# Patient Record
Sex: FEMALE | Race: WHITE | NOT HISPANIC OR LATINO | Employment: PART TIME | URBAN - METROPOLITAN AREA
[De-identification: names, ages, dates, MRNs, and addresses within clinical notes are randomized per-mention and may not be internally consistent; named-entity substitution may affect disease eponyms.]

---

## 2017-05-08 ENCOUNTER — HOSPITAL ENCOUNTER (OUTPATIENT)
Dept: LAB | Facility: CLINIC | Age: 57
End: 2017-05-08
Attending: ALLERGY & IMMUNOLOGY
Payer: COMMERCIAL

## 2017-08-09 ENCOUNTER — OFFICE VISIT (OUTPATIENT)
Dept: FAMILY MEDICINE | Facility: CLINIC | Age: 57
End: 2017-08-09

## 2017-08-09 VITALS
BODY MASS INDEX: 29.44 KG/M2 | OXYGEN SATURATION: 98 % | RESPIRATION RATE: 16 BRPM | WEIGHT: 183.2 LBS | HEART RATE: 60 BPM | SYSTOLIC BLOOD PRESSURE: 132 MMHG | DIASTOLIC BLOOD PRESSURE: 84 MMHG | HEIGHT: 66 IN | TEMPERATURE: 98.3 F

## 2017-08-09 DIAGNOSIS — E78.2 MIXED HYPERLIPIDEMIA: Primary | ICD-10-CM

## 2017-08-09 PROBLEM — Z71.89 ACP (ADVANCE CARE PLANNING): Status: ACTIVE | Noted: 2017-08-09

## 2017-08-09 PROCEDURE — 99214 OFFICE O/P EST MOD 30 MIN: CPT | Performed by: FAMILY MEDICINE

## 2017-08-09 RX ORDER — ATORVASTATIN CALCIUM 20 MG/1
TABLET, FILM COATED ORAL
Qty: 90 TABLET | Refills: 0 | Status: SHIPPED | OUTPATIENT
Start: 2017-08-09 | End: 2017-12-11

## 2017-08-09 NOTE — MR AVS SNAPSHOT
After Visit Summary   8/9/2017    Sofi Otoole    MRN: [id number]           Patient Information     Date Of Birth          1960        Visit Information        Provider Department      8/9/2017 12:00 PM Sally Mc MD [facility]        Today's Diagnoses     Mixed hyperlipidemia    -  1      Care Instructions     Mixed hyperlipidemia  (primary encounter diagnosis)  Comment: get back on your medications daily  Plan: Lipid Profile (QUEST), COMPREHENSIVE METABOLIC         PANEL (QUEST) XCMP, atorvastatin (LIPITOR) 20         MG tablet        1)  Medication: continue current medication regimen unchanged for 90 days  2)  Low fat, low cholesterol diet  3)  Regular aerobic exercise and weight loss/ recheck weight at next visit and BP  4)  Recheck in 1 month fasting to check this medication/ if doing well will refill for 6 months total, then back again, sooner should new symptoms or   problems arise.    Patient Education: Reviewed risks of elevated lipids and principles   of treatment.    Congratulations on stopping smoking.            Follow-ups after your visit        Follow-up notes from your care team     Return in about 1 month (around 9/9/2017).      Future tests that were ordered for you today     Open Standing Orders        Priority Remaining Interval Expires Ordered    Lipid Profile (QUEST) Routine 1/1 8/9/2018 8/9/2017    COMPREHENSIVE METABOLIC PANEL (QUEST) XCMP Routine 1/1 8/9/2018 8/9/2017            Who to contact     If you have questions or need follow up information about today's clinic visit or your schedule please contact [facility] directly at [phone].  Normal or non-critical lab and imaging results will be communicated to you by MyChart, letter or phone within 4 business days after the clinic has received the results. If you do not hear from us within 7 days, please contact the clinic through MyChart or phone. If you have  "a critical or abnormal lab result, we will notify you by phone as soon as possible.  Submit refill requests through [employer] or call your pharmacy and they will forward the refill request to us. Please allow 3 business days for your refill to be completed.          Additional Information About Your Visit        Shiram Credithart Information     [employer] lets you send messages to your doctor, view your test results, renew your prescriptions, schedule appointments and more. To sign up, go to [email or web address] . Click on \"Log in\" on the left side of the screen, which will take you to the Welcome page. Then click on \"Sign up Now\" on the right side of the page.     You will be asked to enter the access code listed below, as well as some personal information. Please follow the directions to create your username and password.     Your access code is: [email or web address]  Expires: 2017 12:36 PM     Your access code will  in 90 days. If you need help or a new code, please call your [place] clinic or [phone].        Care EveryWhere ID     This is your Care EveryWhere ID. This could be used by other organizations to access your [place] medical records  [id number]        Your Vitals Were     Pulse Temperature Respirations Height Pulse Oximetry Breastfeeding?    60 98.3  F (36.8  C) (Oral) 16 1.664 m (5' 5.5\") 98% No    BMI (Body Mass Index)                   30.02 kg/m2            Blood Pressure from Last 3 Encounters:   17 132/84   16 134/74   08/10/16 130/78    Weight from Last 3 Encounters:   17 83.1 kg (183 lb 3.2 oz)   16 72.6 kg (160 lb)   08/10/16 75.1 kg (165 lb 9.6 oz)                 Today's Medication Changes          These changes are accurate as of: 17 12:36 PM.  If you have any questions, ask your nurse or doctor.               These medicines have changed or have updated prescriptions.        Dose/Directions    atorvastatin 20 MG tablet   Commonly known as:  LIPITOR   This may " have changed:  See the new instructions.   Used for:  Mixed hyperlipidemia   Changed by:  Sally Mc MD        TAKE 1 TABLET (20 MG) BY MOUTH DAILY   Quantity:  90 tablet   Refills:  0            Where to get your medicines      These medications were sent to [facility] - [place], [place] - [address]  [address] [facility] [address]     Phone:  [phone]     atorvastatin 20 MG tablet                Primary Care Provider Office Phone # Fax #    Sally Mc [phone] [address] [address]        Equal Access to Services     [facility]: Hadii aad ku hadasho Soomaali, waaxda luqadaha, qaybta kaalmada adeegyada, waxbrandyn raya haysalonin adeabdulaziz west . So [facility] [phone].    ATENCIÓN: Si habla español, tiene a meza disposición servicios gratuitos de asistencia lingüística. [facility] [phone].    We comply with applicable federal civil rights laws and [place] laws. We do not discriminate on the basis of race, color, national origin, age, disability sex, sexual orientation or gender identity.            Thank you!     Thank you for choosing [facility]  for your care. Our goal is always to provide you with excellent care. Hearing back from our patients is one way we can continue to improve our services. Please take a few minutes to complete the written survey that you may receive in the mail after your visit with us. Thank you!             Your Updated Medication List - Protect others around you: Learn how to safely use, store and throw away your medicines at www.disposemymeds.org.          This list is accurate as of: 8/9/17 12:36 PM.  Always use your most recent med list.                   Brand Name Dispense Instructions for use Diagnosis    ASPIRIN NOT PRESCRIBED    INTENTIONAL     continuous prn for other Antiplatelet medication not prescribed intentionally due to Refusal by patient     Personal history of tobacco use, presenting hazards to health       atorvastatin 20 MG tablet    LIPITOR    90 tablet    TAKE 1 TABLET (20 MG) BY MOUTH DAILY    Mixed hyperlipidemia

## 2017-08-09 NOTE — PATIENT INSTRUCTIONS
Mixed hyperlipidemia  (primary encounter diagnosis)  Comment: get back on your medications daily  Plan: Lipid Profile (QUEST), COMPREHENSIVE METABOLIC         PANEL (QUEST) XCMP, atorvastatin (LIPITOR) 20         MG tablet        1)  Medication: continue current medication regimen unchanged for 90 days  2)  Low fat, low cholesterol diet  3)  Regular aerobic exercise and weight loss/ recheck weight at next visit and BP  4)  Recheck in 1 month fasting to check this medication/ if doing well will refill for 6 months total, then back again, sooner should new symptoms or   problems arise.    Patient Education: Reviewed risks of elevated lipids and principles   of treatment.    Congratulations on stopping smoking.

## 2017-08-09 NOTE — NURSING NOTE
Sofi is here for med check    Pre-Visit Screening :  Immunizations : up to date  Colonoscopy : is up to date  Mammogram : is up to date  Asthma Action Test/Plan : NA  PHQ9/GAD7 :  NA  Pulse - regular  My Chart - declines    CLASSIFICATION OF OVERWEIGHT AND OBESITY BY BMI                         Obesity Class           BMI(kg/m2)  Underweight                                    < 18.5  Normal                                         18.5-24.9  Overweight                                     25.0-29.9  OBESITY                     I                  30.0-34.9                              II                 35.0-39.9  EXTREME OBESITY             III                >40                             Patient's  BMI Body mass index is 30.02 kg/(m^2).  http://hin.nhlbi.nih.gov/menuplanner/menu.cgi  Questioned patient about current smoking habits.  Pt. recently quit smoking.

## 2017-08-09 NOTE — PROGRESS NOTES
"SUBJECTIVE:  Sofi Otoole is an 56 year old female who presents for evaluation of   hyperlipidemia. She has been diagnosed in the past as having   combined hyperlipidemia. She indicates that she is feeling well   and denies any symptoms of cardiovascular disease. Specifically   denies chest pain, palpitations, dyspnea, orthopnea, PND,   claudication or peripheral edema. Treatment modalities employed to   this point include diet, regular aerobic exercise and Lipitor. Current medication   regimen is as listed below. Patient denies any side effects of   medication.    Family history: positive for hypertension, cardiovascular disease and elevated cholesterol  Age at diagnosis of hyperlipidemia: 41  Cardiovascular risk factors: previous smoker, family history, lipids, obesity and stress    Current Outpatient Prescriptions   Medication     atorvastatin (LIPITOR) 20 MG tablet     ASPIRIN NOT PRESCRIBED, INTENTIONAL,     No current facility-administered medications for this visit.      Allergies   Allergen Reactions     Morphine Nausea and Vomiting       Social History   Substance Use Topics     Smoking status: Former Smoker     Packs/day: 0.75     Years: 18.00     Types: Cigarettes     Quit date: 8/20/2016     Smokeless tobacco: Never Used     Alcohol use 0.0 oz/week     0 Standard drinks or equivalent per week       OBJECTIVE:  /86 (BP Location: Left arm, Patient Position: Chair, Cuff Size: Adult Regular)  Pulse 60  Temp 98.3  F (36.8  C) (Oral)  Ht 1.664 m (5' 5.5\")  Wt 83.1 kg (183 lb 3.2 oz)  SpO2 98%  Breastfeeding? No  BMI 30.02 kg/m2  Repeat BP R arm seated = 150/86  with regular size cuff.  Skin: negative  Fundi: deferred  Lungs: negative, Percussion normal. Good diaphragmatic excursion. Lungs clear  Heart: negative, PMI normal. No lifts, heaves, or thrills. RRR. No murmurs, clicks gallops or rub  Peripheral pulses: radial=4/4, femoral=4/4, popliteal=4/4, dorsalis pedis=4/4,  Abd; The abdomen is soft " without tenderness, guarding, mass or organomegaly. Bowel sounds are normal. No CVA tenderness or inguinal adenopathy noted.  BMI : Body mass index is 30.02 kg/(m^2).    ASSESSMENT:(E78.2) Mixed hyperlipidemia  (primary encounter diagnosis)  Comment: get back on your medications daily  Plan: Lipid Profile (QUEST), COMPREHENSIVE METABOLIC         PANEL (QUEST) XCMP, atorvastatin (LIPITOR) 20         MG tablet        1)  Medication: continue current medication regimen unchanged for 90 days  2)  Low fat, low cholesterol diet  3)  Regular aerobic exercise and weight loss/ recheck weight at next visit and BP  4)  Recheck in 1 month fasting to check this medication/ if doing well will refill for 6 months total, then back again, sooner should new symptoms or   problems arise.    Patient Education: Reviewed risks of elevated lipids and principles   of treatment.    Congratulations on stopping smoking.

## 2017-12-06 DIAGNOSIS — E78.2 MIXED HYPERLIPIDEMIA: ICD-10-CM

## 2017-12-06 PROCEDURE — 80061 LIPID PANEL: CPT | Mod: 90 | Performed by: FAMILY MEDICINE

## 2017-12-06 PROCEDURE — 36415 COLL VENOUS BLD VENIPUNCTURE: CPT | Performed by: FAMILY MEDICINE

## 2017-12-06 PROCEDURE — 80053 COMPREHEN METABOLIC PANEL: CPT | Mod: 90 | Performed by: FAMILY MEDICINE

## 2017-12-06 NOTE — LETTER
December 11, 2017      Sofi DILLAN Xiang  [address] [address]        Dear ,    We are writing to inform you of your test results.    Test results indicate you may require additional follow up. Your total and LDL ( bad) cholesterol are elevated in a mild range. Your triglycerides level are also minimally elevated.  This indicates a mild increased risk for heart attack and stroke. See previous values as well as your last labs on next page. Read handout.        If you have any questions or concerns, please call the clinic at the number listed above.       Sincerely,        Sally Mc MD

## 2017-12-07 LAB
ALBUMIN SERPL-MCNC: 4.6 G/DL (ref 3.6–5.1)
ALBUMIN/GLOB SERPL: 1.4 (CALC) (ref 1–2.5)
ALP SERPL-CCNC: 77 U/L (ref 33–130)
ALT SERPL-CCNC: 18 U/L (ref 6–29)
AST SERPL-CCNC: 17 U/L (ref 10–35)
BILIRUB SERPL-MCNC: 1.1 MG/DL (ref 0.2–1.2)
BUN SERPL-MCNC: 18 MG/DL (ref 7–25)
BUN/CREATININE RATIO: NORMAL (CALC) (ref 6–22)
CALCIUM SERPL-MCNC: 9.6 MG/DL (ref 8.6–10.4)
CHLORIDE SERPLBLD-SCNC: 103 MMOL/L (ref 98–110)
CHOLEST SERPL-MCNC: 225 MG/DL
CHOLEST/HDLC SERPL: 4.3 (CALC)
CO2 SERPL-SCNC: 25 MMOL/L (ref 20–31)
CREAT SERPL-MCNC: 0.84 MG/DL (ref 0.5–1.05)
EGFR AFRICAN AMERICAN - QUEST: 90 ML/MIN/1.73M2
GFR SERPL CREATININE-BSD FRML MDRD: 78 ML/MIN/1.73M2
GLOBULIN, CALCULATED - QUEST: 3.4 G/DL (CALC) (ref 1.9–3.7)
GLUCOSE - QUEST: 99 MG/DL (ref 65–99)
HDLC SERPL-MCNC: 52 MG/DL
LDLC SERPL CALC-MCNC: 143 MG/DL (CALC)
NONHDLC SERPL-MCNC: 173 MG/DL (CALC)
POTASSIUM SERPL-SCNC: 4.3 MMOL/L (ref 3.5–5.3)
PROT SERPL-MCNC: 8 G/DL (ref 6.1–8.1)
SODIUM SERPL-SCNC: 140 MMOL/L (ref 135–146)
TRIGL SERPL-MCNC: 162 MG/DL

## 2017-12-11 DIAGNOSIS — E78.2 MIXED HYPERLIPIDEMIA: ICD-10-CM

## 2017-12-11 RX ORDER — ATORVASTATIN CALCIUM 20 MG/1
TABLET, FILM COATED ORAL
Qty: 90 TABLET | Refills: 0 | Status: SHIPPED | OUTPATIENT
Start: 2017-12-11 | End: 2018-03-26

## 2017-12-11 NOTE — TELEPHONE ENCOUNTER
Please review, pt was in and had labs done.    Pending Prescriptions:                       Disp   Refills    atorvastatin (LIPITOR) 20 MG tablet [Phar*90 tab*0            Sig: TAKE 1 TABLET DAILY    If approved please close.

## 2017-12-13 ENCOUNTER — TRANSFERRED RECORDS (OUTPATIENT)
Dept: FAMILY MEDICINE | Facility: CLINIC | Age: 57
End: 2017-12-13

## 2018-03-26 DIAGNOSIS — E78.2 MIXED HYPERLIPIDEMIA: ICD-10-CM

## 2018-03-27 RX ORDER — ATORVASTATIN CALCIUM 20 MG/1
TABLET, FILM COATED ORAL
Qty: 90 TABLET | Refills: 0 | Status: SHIPPED | OUTPATIENT
Start: 2018-03-27 | End: 2018-06-13

## 2018-03-27 NOTE — TELEPHONE ENCOUNTER
"Sofi is requesting a refill of the following    Pending Prescriptions:                       Disp   Refills    atorvastatin (LIPITOR) 20 MG tablet [Phar*90 tab*0            Sig: TAKE 1 TABLET DAILY    I called the pt to inform her that she was due back for a visit in 6 months and she said that at her last visit she was told she could have her refills for one year. I read to her the message in the chart   \" 4)  Recheck in 1 month fasting to check this medication/ if doing well will refill for 6 months total, then back again, sooner should new symptoms or   problems arise. \"  And she said she wanted me to check with Dr. Mc if she had to come back still because she was out of medication. I asked if she had a local pharmacy I could send 30 days to and she said no, it has to be [facility].   I did inform her you weren't in office as well so id have to wait to hear back    Please advise  "

## 2018-03-27 NOTE — TELEPHONE ENCOUNTER
Sent 90 days. No further refills without a fasting visit. Remind her that her last labs were not in the normal range on her medications.We will be considering a dose change.

## 2018-06-03 DIAGNOSIS — E78.2 MIXED HYPERLIPIDEMIA: ICD-10-CM

## 2018-06-04 RX ORDER — ATORVASTATIN CALCIUM 20 MG/1
TABLET, FILM COATED ORAL
Qty: 30 TABLET | Refills: 0 | OUTPATIENT
Start: 2018-06-04

## 2018-06-04 NOTE — TELEPHONE ENCOUNTER
Pending Prescriptions:                       Disp   Refills    atorvastatin (LIPITOR) 20 MG tablet [Phar*30 tab*0            Sig: TAKE 1 TABLET DAILY    Pt due for a fasting ov  Please fax 30 and send to [facility]  [phone] (home) [phone] (work)

## 2018-06-13 ENCOUNTER — OFFICE VISIT (OUTPATIENT)
Dept: FAMILY MEDICINE | Facility: CLINIC | Age: 58
End: 2018-06-13

## 2018-06-13 VITALS
BODY MASS INDEX: 28.22 KG/M2 | HEIGHT: 66 IN | HEART RATE: 76 BPM | WEIGHT: 175.6 LBS | OXYGEN SATURATION: 98 % | RESPIRATION RATE: 12 BRPM | DIASTOLIC BLOOD PRESSURE: 70 MMHG | TEMPERATURE: 98.5 F | SYSTOLIC BLOOD PRESSURE: 124 MMHG

## 2018-06-13 DIAGNOSIS — Z76.0 ENCOUNTER FOR MEDICATION REFILL: ICD-10-CM

## 2018-06-13 DIAGNOSIS — M54.50 CHRONIC RIGHT-SIDED LOW BACK PAIN WITHOUT SCIATICA: ICD-10-CM

## 2018-06-13 DIAGNOSIS — Z23 NEED FOR VACCINATION: ICD-10-CM

## 2018-06-13 DIAGNOSIS — E78.2 MIXED HYPERLIPIDEMIA: Primary | ICD-10-CM

## 2018-06-13 DIAGNOSIS — Z13.1 SCREENING FOR DIABETES MELLITUS: ICD-10-CM

## 2018-06-13 DIAGNOSIS — R10.9 FLANK PAIN: ICD-10-CM

## 2018-06-13 DIAGNOSIS — G89.29 CHRONIC RIGHT-SIDED LOW BACK PAIN WITHOUT SCIATICA: ICD-10-CM

## 2018-06-13 DIAGNOSIS — T78.3XXD ANGIOEDEMA, SUBSEQUENT ENCOUNTER: ICD-10-CM

## 2018-06-13 LAB
% GRANULOCYTES: 62.9 %
ALBUMIN (URINE): ABNORMAL MG/DL
APPEARANCE UR: ABNORMAL
BACTERIA, UR: ABNORMAL
BILIRUB UR QL: ABNORMAL
CASTS/LPF: ABNORMAL
COLOR UR: YELLOW
EP/HPF: ABNORMAL
GLUCOSE URINE: ABNORMAL MG/DL
HCT VFR BLD AUTO: 43 % (ref 35–47)
HEMOGLOBIN: 14.6 G/DL (ref 11.7–15.7)
HGB UR QL: ABNORMAL
KETONES UR QL: ABNORMAL MG/DL
LEUKOCYTE ESTERASE - QUEST: ABNORMAL
LYMPHOCYTES NFR BLD AUTO: 27.6 %
MCH RBC QN AUTO: 31 PG (ref 26–33)
MCHC RBC AUTO-ENTMCNC: 34 G/DL (ref 31–36)
MCV RBC AUTO: 92.7 FL (ref 78–100)
MISC.: ABNORMAL
MONOCYTES NFR BLD AUTO: 9.5 %
NITRITE UR QL STRIP: ABNORMAL
PH UR STRIP: 6.5 PH (ref 5–7)
PLATELET COUNT - QUEST: 241 10^9/L (ref 150–375)
RBC # BLD AUTO: 4.64 10*12/L (ref 3.8–5.2)
RBC, UR MICRO: ABNORMAL (ref ?–2)
SP. GRAVITY: 1.01
UROBILINOGEN UR QL STRIP: 0.2 EU/DL (ref 0.2–1)
WBC # BLD AUTO: 5.4 10*9/L (ref 4–11)
WBC, UR MICRO: ABNORMAL (ref ?–2)

## 2018-06-13 PROCEDURE — 90471 IMMUNIZATION ADMIN: CPT | Performed by: FAMILY MEDICINE

## 2018-06-13 PROCEDURE — 85025 COMPLETE CBC W/AUTO DIFF WBC: CPT | Performed by: FAMILY MEDICINE

## 2018-06-13 PROCEDURE — 90715 TDAP VACCINE 7 YRS/> IM: CPT | Performed by: FAMILY MEDICINE

## 2018-06-13 PROCEDURE — 99214 OFFICE O/P EST MOD 30 MIN: CPT | Mod: 25 | Performed by: FAMILY MEDICINE

## 2018-06-13 PROCEDURE — 36415 COLL VENOUS BLD VENIPUNCTURE: CPT | Performed by: FAMILY MEDICINE

## 2018-06-13 PROCEDURE — 81001 URINALYSIS AUTO W/SCOPE: CPT | Performed by: FAMILY MEDICINE

## 2018-06-13 RX ORDER — ATORVASTATIN CALCIUM 20 MG/1
20 TABLET, FILM COATED ORAL DAILY
Qty: 90 TABLET | Refills: 1 | Status: SHIPPED | OUTPATIENT
Start: 2018-06-13 | End: 2018-11-29

## 2018-06-13 RX ORDER — CETIRIZINE HYDROCHLORIDE 10 MG/1
10 TABLET ORAL DAILY
COMMUNITY
Start: 2018-01-16

## 2018-06-13 RX ORDER — DOXEPIN HYDROCHLORIDE 10 MG/1
10 CAPSULE ORAL DAILY
COMMUNITY
Start: 2018-03-07 | End: 2019-07-01

## 2018-06-13 NOTE — MR AVS SNAPSHOT
After Visit Summary   6/13/2018    Sofi Otoole    MRN: [id number]           Patient Information     Date Of Birth          1960        Visit Information        Provider Department      6/13/2018 8:50 AM Sally Mc MD [facility]        Today's Diagnoses     Mixed hyperlipidemia    -  1    Chronic right-sided low back pain without sciatica        Flank pain        Angioedema, subsequent encounter        Screening for diabetes mellitus        Encounter for medication refill        Need for vaccination          Care Instructions    Await labs  Urine culture pending    Rehab stretches/ exercises to begin immediately and given in writing with illustrations.  See letter for massage therapy    1)  Medication: continue current medication regimen unchanged  2)  Low fat, low cholesterol diet  3)  Regular aerobic exercise  4)  Recheck in 6 months fasting, sooner should new symptoms or   problems arise.    Patient Education: Reviewed risks of elevated lipids and principles   of treatment.              Follow-ups after your visit        Who to contact     If you have questions or need follow up information about today's clinic visit or your schedule please contact [facility] directly at [phone].  Normal or non-critical lab and imaging results will be communicated to you by MyChart, letter or phone within 4 business days after the clinic has received the results. If you do not hear from us within 7 days, please contact the clinic through MyChart or phone. If you have a critical or abnormal lab result, we will notify you by phone as soon as possible.  Submit refill requests through [employer] or call your pharmacy and they will forward the refill request to us. Please allow 3 business days for your refill to be completed.          Additional Information About Your Visit        Care EveryWhere ID     This is your Care EveryWhere ID. This could be used by  "other organizations to access your [place] medical records  [id number]        Your Vitals Were     Pulse Temperature Respirations Height Last Period Pulse Oximetry    76 98.5  F (36.9  C) (Oral) 12 1.676 m (5' 6\") (LMP Unknown) 98%    Breastfeeding? BMI (Body Mass Index)                No 28.34 kg/m2           Blood Pressure from Last 3 Encounters:   06/13/18 124/70   08/09/17 132/84   08/26/16 134/74    Weight from Last 3 Encounters:   06/13/18 79.7 kg (175 lb 9.6 oz)   08/09/17 83.1 kg (183 lb 3.2 oz)   08/26/16 72.6 kg (160 lb)              We Performed the Following     AUTO HEMOGRAM/PLATE/DIFF [80563.000]     COMPREHENSIVE METABOLIC PANEL     HCL  Urinalysis, Routine ([facility])     LIPID PANEL     TDAP VACCINE (BOOSTRIX)     Urine Culture Aerobic Bacterial     VACCINE ADMINISTRATION, INITIAL     VENIPUNC FNGR,HEEL,EAR [88818]          Today's Medication Changes          These changes are accurate as of 6/13/18 10:00 AM.  If you have any questions, ask your nurse or doctor.               These medicines have changed or have updated prescriptions.        Dose/Directions    atorvastatin 20 MG tablet   Commonly known as:  LIPITOR   This may have changed:  See the new instructions.   Used for:  Mixed hyperlipidemia   Changed by:  Sally Mc MD        Dose:  20 mg   Take 1 tablet (20 mg) by mouth daily   Quantity:  90 tablet   Refills:  1            Where to get your medicines      These medications were sent to [facility] [address] [address]     Phone:  [phone]     atorvastatin 20 MG tablet                Primary Care Provider Office Phone # Fax #    Sally Mc -[phone] [address] [address]        Equal Access to Services     [facility]EL[facility] ROBERTS [facility]: Hadii aad ku hadasho Sodulceali, waaxda luqadaha, qaybta kaalmada adeegyada, waxay elver lees. So wac " [phone].    ATENCIÓN: Si ericka dowell, tiene a meza disposición servicios gratuitos de asistencia lingüística. Ildefonso al [phone].    We comply with applicable federal civil rights laws and [place] laws. We do not discriminate on the basis of race, color, national origin, age, disability, sex, sexual orientation, or gender identity.            Thank you!     Thank you for choosing [facility]  for your care. Our goal is always to provide you with excellent care. Hearing back from our patients is one way we can continue to improve our services. Please take a few minutes to complete the written survey that you may receive in the mail after your visit with us. Thank you!             Your Updated Medication List - Protect others around you: Learn how to safely use, store and throw away your medicines at www.disposemymeds.org.          This list is accurate as of 6/13/18 10:00 AM.  Always use your most recent med list.                   Brand Name Dispense Instructions for use Diagnosis    ASPIRIN NOT PRESCRIBED    INTENTIONAL     continuous prn for other Antiplatelet medication not prescribed intentionally due to Refusal by patient    Personal history of tobacco use, presenting hazards to health       atorvastatin 20 MG tablet    LIPITOR    90 tablet    Take 1 tablet (20 mg) by mouth daily    Mixed hyperlipidemia       cetirizine 10 MG tablet    zyrTEC     Take 10 mg by mouth daily    Angioedema, subsequent encounter       doxepin 10 MG capsule    SINEquan     Take 10 mg by mouth daily    Angioedema, subsequent encounter

## 2018-06-13 NOTE — PATIENT INSTRUCTIONS
Await labs  Urine culture pending    Rehab stretches/ exercises to begin immediately and given in writing with illustrations.  See letter for massage therapy    1)  Medication: continue current medication regimen unchanged  2)  Low fat, low cholesterol diet  3)  Regular aerobic exercise  4)  Recheck in 6 months fasting, sooner should new symptoms or   problems arise.    Patient Education: Reviewed risks of elevated lipids and principles   of treatment.

## 2018-06-13 NOTE — LETTER
Felisa 15, 2018      Sofi DILLAN Xiang  [address] [address] [facility] [address]        Dear ,    We are writing to inform you of your test results.    Negative urine culture for an infection.  Borderline fasting glucose indicating a future risk of type 2 diabetes.  Minimal elevation in LDL ( bad) cholesterol. See the next page for your number results.     Your excellent HDL (good) cholesterol and ratio make your risks of heart attack and stroke in the normal range.    A low fat diet, regular aerobic exercise like walking 30 minutes daily and weight loss is the treatment recommendations at this time            If you have any questions or concerns, please call the clinic at the number listed above.       Sincerely,        Sally Mc MD

## 2018-06-13 NOTE — LETTER
June 13, 2018      Sofi Otoole  [address] [address] [facility] [address]        To Whom It May Concern:    Sofi Otoole was seen in our clinic. She has chronic muscular low back pain and can benefit from occasional massage therapy.          Sincerely,        Sally Mc MD

## 2018-06-13 NOTE — NURSING NOTE
Sofi GRIMALDO Otoole is here today for a fasting medication recheck and flank pain for the past week.    Pre-visit Screening:    Immunizations:  up to date - Tdap Today  Colonoscopy:  is up to date  Mammogram: is up to date (Had done around September, haven't received results)  Asthma Action Test/Plan:  NA  PHQ9:  NA  GAD7:  NA    Questioned patient about current smoking habits Pt. quit smoking some time ago.    Is it ok to leave a detailed message on cell phone's voice mail for today's visit only? Yes     Telephone Information:   Mobile [phone]       Celeste Javier, [facility]

## 2018-06-14 LAB
ALBUMIN SERPL-MCNC: 4.6 G/DL (ref 3.6–5.1)
ALBUMIN/GLOB SERPL: 1.5 (CALC) (ref 1–2.5)
ALP SERPL-CCNC: 70 U/L (ref 33–130)
ALT SERPL-CCNC: 30 U/L (ref 6–29)
AST SERPL-CCNC: 26 U/L (ref 10–35)
BILIRUB SERPL-MCNC: 1 MG/DL (ref 0.2–1.2)
BUN SERPL-MCNC: 14 MG/DL (ref 7–25)
BUN/CREATININE RATIO: ABNORMAL (CALC) (ref 6–22)
CALCIUM SERPL-MCNC: 9.7 MG/DL (ref 8.6–10.4)
CHLORIDE SERPLBLD-SCNC: 101 MMOL/L (ref 98–110)
CHOLEST SERPL-MCNC: 175 MG/DL
CHOLEST/HDLC SERPL: 3 (CALC)
CO2 SERPL-SCNC: 28 MMOL/L (ref 20–31)
CREAT SERPL-MCNC: 0.72 MG/DL (ref 0.5–1.05)
EGFR AFRICAN AMERICAN - QUEST: 108 ML/MIN/1.73M2
GFR SERPL CREATININE-BSD FRML MDRD: 94 ML/MIN/1.73M2
GLOBULIN, CALCULATED - QUEST: 3 G/DL (CALC) (ref 1.9–3.7)
GLUCOSE - QUEST: 100 MG/DL (ref 65–99)
HDLC SERPL-MCNC: 59 MG/DL
LDLC SERPL CALC-MCNC: 100 MG/DL (CALC)
NONHDLC SERPL-MCNC: 116 MG/DL (CALC)
POTASSIUM SERPL-SCNC: 4.1 MMOL/L (ref 3.5–5.3)
PROT SERPL-MCNC: 7.6 G/DL (ref 6.1–8.1)
SODIUM SERPL-SCNC: 140 MMOL/L (ref 135–146)
TRIGL SERPL-MCNC: 74 MG/DL

## 2018-06-15 LAB — URINE VOIDED CULTURE: NORMAL

## 2018-11-28 DIAGNOSIS — E78.2 MIXED HYPERLIPIDEMIA: ICD-10-CM

## 2018-11-28 RX ORDER — ATORVASTATIN CALCIUM 20 MG/1
TABLET, FILM COATED ORAL
Qty: 90 TABLET | Refills: 1 | OUTPATIENT
Start: 2018-11-28

## 2018-11-28 NOTE — TELEPHONE ENCOUNTER
Refused Prescriptions:                       Disp   Refills    atorvastatin (LIPITOR) 20 MG tablet [Pharm*90 tab*1        Sig: TAKE 1 TABLET DAILY  Refused By: NINFA ACOSTA  Reason for Refusal: Patient needs appointment    Pt is due for fasting ov in December   Last refill 6-2018  No local pharmacy in chart-no my chart  Sheila  [phone] (home) [phone] (work)

## 2018-11-29 RX ORDER — ATORVASTATIN CALCIUM 20 MG/1
20 TABLET, FILM COATED ORAL DAILY
Qty: 90 TABLET | Refills: 1 | Status: SHIPPED | OUTPATIENT
Start: 2018-11-29 | End: 2019-06-10

## 2018-11-29 NOTE — TELEPHONE ENCOUNTER
I spoke to Sofi and she recalls having a ocnversation with Dr Mc saying she could go a full year with this prescription.  She has been on it so long and doesn't feel she needs to come in every 6 months.  Dr Mc can you please advise    Refused Prescriptions:                       Disp   Refills    atorvastatin (LIPITOR) 20 MG tablet [Pharm*90 tab*1        Sig: TAKE 1 TABLET DAILY  Refused By: NINFA ACOSTA I  Reason for Refusal: Patient needs appointment

## 2018-11-30 NOTE — TELEPHONE ENCOUNTER
Her last labs looked great for cholesterol. However her fasting glucose was borderline elevated. I have refilled her cholesterol medication for 6 months. She should consider a recheck of her glucose fasting or an A1C non-fasting test to screen for diabetes.

## 2019-06-10 DIAGNOSIS — E78.2 MIXED HYPERLIPIDEMIA: ICD-10-CM

## 2019-06-10 RX ORDER — ATORVASTATIN CALCIUM 20 MG/1
20 TABLET, FILM COATED ORAL DAILY
Qty: 30 TABLET | Refills: 0 | COMMUNITY
Start: 2019-06-10 | End: 2019-07-01

## 2019-07-01 ENCOUNTER — OFFICE VISIT (OUTPATIENT)
Dept: FAMILY MEDICINE | Facility: CLINIC | Age: 59
End: 2019-07-01

## 2019-07-01 VITALS
HEART RATE: 81 BPM | HEIGHT: 67 IN | TEMPERATURE: 98.6 F | BODY MASS INDEX: 28.22 KG/M2 | SYSTOLIC BLOOD PRESSURE: 134 MMHG | RESPIRATION RATE: 18 BRPM | WEIGHT: 179.8 LBS | OXYGEN SATURATION: 98 % | DIASTOLIC BLOOD PRESSURE: 72 MMHG

## 2019-07-01 DIAGNOSIS — L71.9 ROSACEA: ICD-10-CM

## 2019-07-01 DIAGNOSIS — Z13.1 SCREENING FOR DIABETES MELLITUS: ICD-10-CM

## 2019-07-01 DIAGNOSIS — Z79.899 ENCOUNTER FOR LONG-TERM (CURRENT) USE OF MEDICATIONS: ICD-10-CM

## 2019-07-01 DIAGNOSIS — E78.2 MIXED HYPERLIPIDEMIA: Primary | ICD-10-CM

## 2019-07-01 LAB
ALBUMIN SERPL-MCNC: 4.5 G/DL (ref 3.6–5.1)
ALBUMIN/GLOB SERPL: 1.6 {RATIO} (ref 1–2.5)
ALP SERPL-CCNC: 68 U/L (ref 33–130)
ALT 1742-6: 12 U/L (ref 5–30)
AST 1920-8: 12 U/L (ref 7–31)
BILIRUB SERPL-MCNC: 1.1 MG/DL (ref 0.2–1.2)
BUN SERPL-MCNC: 17 MG/DL (ref 7–25)
BUN/CREATININE RATIO: 21.8 (ref 6–22)
CALCIUM SERPL-MCNC: 9.7 MG/DL (ref 8.6–10.3)
CHLORIDE SERPLBLD-SCNC: 105 MMOL/L (ref 98–110)
CHOLEST SERPL-MCNC: 222 MG/DL (ref 0–199)
CHOLEST/HDLC SERPL: 4 {RATIO} (ref 0–5)
CO2 SERPL-SCNC: 31.3 MMOL/L (ref 20–32)
CREAT SERPL-MCNC: 0.78 MG/DL (ref 0.7–1.18)
GLOBULIN, CALCULATED - QUEST: 2.8 (ref 1.9–3.7)
GLUCOSE SERPL-MCNC: 106 MG/DL (ref 60–99)
HDLC SERPL-MCNC: 57 MG/DL (ref 40–150)
LDLC SERPL CALC-MCNC: 140 MG/DL (ref 0–130)
POTASSIUM SERPL-SCNC: 4.11 MMOL/L (ref 3.5–5.3)
PROT SERPL-MCNC: 7.3 G/DL (ref 6.1–8.1)
SODIUM SERPL-SCNC: 143.6 MMOL/L (ref 135–146)
TRIGL SERPL-MCNC: 123 MG/DL (ref 0–149)

## 2019-07-01 PROCEDURE — 80061 LIPID PANEL: CPT | Performed by: FAMILY MEDICINE

## 2019-07-01 PROCEDURE — 36415 COLL VENOUS BLD VENIPUNCTURE: CPT | Performed by: FAMILY MEDICINE

## 2019-07-01 PROCEDURE — 99214 OFFICE O/P EST MOD 30 MIN: CPT | Performed by: FAMILY MEDICINE

## 2019-07-01 PROCEDURE — 80053 COMPREHEN METABOLIC PANEL: CPT | Performed by: FAMILY MEDICINE

## 2019-07-01 RX ORDER — ATORVASTATIN CALCIUM 20 MG/1
20 TABLET, FILM COATED ORAL DAILY
Qty: 90 TABLET | Refills: 3 | Status: SHIPPED | OUTPATIENT
Start: 2019-07-01

## 2019-07-01 SDOH — ECONOMIC STABILITY: FOOD INSECURITY: HOW HARD IS IT FOR YOU TO PAY FOR THE VERY BASICS LIKE FOOD, HOUSING, MEDICAL CARE, AND HEATING?: NOT HARD AT ALL

## 2019-07-01 SDOH — ECONOMIC STABILITY: TRANSPORTATION INSECURITY: IN THE PAST 12 MONTHS, HAS LACK OF TRANSPORTATION KEPT YOU FROM MEDICAL APPOINTMENTS OR FROM GETTING MEDICATIONS?: NO

## 2019-07-01 ASSESSMENT — ACTIVITIES OF DAILY LIVING (ADL): LACK_OF_TRANSPORTATION: NO

## 2019-07-01 ASSESSMENT — MIFFLIN-ST. JEOR: SCORE: 1425.26

## 2019-07-01 NOTE — LETTER
July 8, 2019      Sofi GRIMALDO Xiang  [address] [facility] [address]        Dear ,    We are writing to inform you of your test results.    Your test results fall within the expected range(s) or remain unchanged from previous results. See next page. Mild increase in fasting glucose in the borderline range. This indicates a future risk of type 2 diabetes. A low fat diet, regular aerobic exercise like walking 30 minutes daily and weight control is the treatment recommendations at this time.     Please continue with current treatment plan.        If you have any questions or concerns, please call the clinic at the number listed above.       Sincerely,        Sally Mc MD

## 2019-07-01 NOTE — PROGRESS NOTES
"SUBJECTIVE:  Sofi Otoole is an 58 year old female who presents for evaluation of   hyperlipidemia. She has been diagnosed in the past as having   combined hyperlipidemia. She indicates that she is feeling well   and denies any symptoms of cardiovascular disease. Specifically   denies chest pain, palpitations, dyspnea, orthopnea, PND,   claudication or peripheral edema. Treatment modalities employed to   this point include diet, regular aerobic exercise and Lipitor. Current medication   regimen is as listed below. Patient denies any side effects of   medication.    Family history: positive for hypertension, cardiovascular disease and elevated cholesterol  Age at diagnosis of hyperlipidemia: 41  Cardiovascular risk factors: previous smoker, family history, lipids, obesity and stress    Current Outpatient Medications   Medication     ASPIRIN NOT PRESCRIBED, INTENTIONAL,     atorvastatin (LIPITOR) 20 MG tablet     cetirizine (ZYRTEC) 10 MG tablet     No current facility-administered medications for this visit.      Allergies   Allergen Reactions     Morphine Nausea and Vomiting       Social History     Tobacco Use     Smoking status: Former Smoker     Packs/day: 0.75     Years: 30.00     Pack years: 22.50     Types: Cigarettes     Last attempt to quit: 2016     Years since quittin.8     Smokeless tobacco: Never Used   Substance Use Topics     Alcohol use: Yes     Alcohol/week: 0.0 oz       OBJECTIVE:  /72 (BP Location: Left arm, Patient Position: Sitting, Cuff Size: Adult Large)   Pulse 81   Temp 98.6  F (37  C) (Oral)   Ht 1.689 m (5' 6.5\")   Wt 81.6 kg (179 lb 12.8 oz)   LMP  (LMP Unknown)   SpO2 98%   BMI 28.59 kg/m    Repeat BP R arm seated = 134/72  with regular size cuff.  Skin: telangiectasias cheek and rosacea  Fundi: deferred  Lungs: negative, Percussion normal. Good diaphragmatic excursion. Lungs clear  Heart: negative, PMI normal. No lifts, heaves, or thrills. RRR. No murmurs, clicks " gallops or rub  Peripheral pulses: radial=4/4, femoral=4/4, popliteal=4/4, dorsalis pedis=4/4,  Abd; The abdomen is soft without tenderness, guarding, mass or organomegaly. Bowel sounds are normal. No CVA tenderness or inguinal adenopathy noted.  BMI : Body mass index is 28.59 kg/m .    ASSESSMENT:(E78.2) Mixed hyperlipidemia  (primary encounter diagnosis)  Plan: Lipid Panel ([facility]), VENOUS COLLECTION,         atorvastatin (LIPITOR) 20 MG tablet        1)  Medication: continue current medication regimen unchanged  2)  Low fat, low cholesterol diet  3)  Regular aerobic exercise  4)  Recheck in 1 year, sooner should new symptoms or   problems arise.    Patient Education: Reviewed risks of elevated lipids and principles   of treatment.        (L71.9) Rosacea  Comment: diagnosis and options reviewed  Plan: DERMATOLOGY REFERRAL        requests cosmetic telangectasia help/ referral given    (Z79.899) Encounter for long-term (current) use of medications  Plan: Lipid Panel ([facility]), Comprehensive Metobolic         Panel ([facility]), VENOUS COLLECTION            (Z13.1) Screening for diabetes mellitus  Plan: Comprehensive Metobolic Panel ([facility]), VENOUS         COLLECTION

## 2019-07-01 NOTE — PATIENT INSTRUCTIONS
1)  Medication: continue current medication regimen unchanged  2)  Low fat, low cholesterol diet  3)  Regular aerobic exercise  4)  Recheck in 1 year, sooner should new symptoms or   problems arise.    Patient Education: Reviewed risks of elevated lipids and principles   of treatment.        (L71.9) Rosacea  Comment: diagnosis and options reviewed  Plan: DERMATOLOGY REFERRAL        requests cosmetic telangectasia help/ referral given

## 2019-07-01 NOTE — NURSING NOTE
Sofi is here today for a fasting med recheck.    Pre-visit Screening:  Immunizations:  up to date  Colonoscopy:  is up to date  Mammogram: is up to date  Asthma Action Test/Plan:  REYNA  PHQ9:  NA  GAD7:  NA  Questioned patient about current smoking habits Pt. quit smoking some time ago.  Ok to leave detailed message on voice mail for today's visit only Yes, phone # [phone]

## 2020-08-17 DIAGNOSIS — E78.2 MIXED HYPERLIPIDEMIA: ICD-10-CM

## 2020-08-17 RX ORDER — ATORVASTATIN CALCIUM 20 MG/1
TABLET, FILM COATED ORAL
Qty: 90 TABLET | Refills: 3 | COMMUNITY
Start: 2020-08-17

## 2020-08-17 NOTE — TELEPHONE ENCOUNTER
Sofi Otoole is requesting a refill of:    Refused Prescriptions:                       Disp   Refills    atorvastatin (LIPITOR) 20 MG tablet [Pharm*90 tab*3        Sig: TAKE 1 TABLET DAILY  Refused By: EVY PARKER  Reason for Refusal: Patient needs appointment    Pt last seen 07/01/19, due for yearly FASTING OV

## 2020-12-07 ENCOUNTER — TELEPHONE (OUTPATIENT)
Dept: FAMILY MEDICINE | Facility: CLINIC | Age: 60
End: 2020-12-07

## 2022-10-09 ENCOUNTER — HEALTH MAINTENANCE LETTER (OUTPATIENT)
Age: 62
End: 2022-10-09

## 2023-10-28 ENCOUNTER — HEALTH MAINTENANCE LETTER (OUTPATIENT)
Age: 63
End: 2023-10-28